# Patient Record
Sex: MALE | Race: BLACK OR AFRICAN AMERICAN | NOT HISPANIC OR LATINO | Employment: STUDENT | URBAN - METROPOLITAN AREA
[De-identification: names, ages, dates, MRNs, and addresses within clinical notes are randomized per-mention and may not be internally consistent; named-entity substitution may affect disease eponyms.]

---
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
## 2020-08-10 ENCOUNTER — TELEPHONE (OUTPATIENT)
Dept: SPORTS MEDICINE | Facility: CLINIC | Age: 20
End: 2020-08-10

## 2020-08-10 DIAGNOSIS — Z20.822 EXPOSURE TO COVID-19 VIRUS: Primary | ICD-10-CM

## 2020-08-31 ENCOUNTER — LAB VISIT (OUTPATIENT)
Dept: SPORTS MEDICINE | Facility: CLINIC | Age: 20
End: 2020-08-31
Payer: OTHER GOVERNMENT

## 2020-08-31 DIAGNOSIS — Z20.822 EXPOSURE TO COVID-19 VIRUS: ICD-10-CM

## 2020-08-31 PROCEDURE — U0003 INFECTIOUS AGENT DETECTION BY NUCLEIC ACID (DNA OR RNA); SEVERE ACUTE RESPIRATORY SYNDROME CORONAVIRUS 2 (SARS-COV-2) (CORONAVIRUS DISEASE [COVID-19]), AMPLIFIED PROBE TECHNIQUE, MAKING USE OF HIGH THROUGHPUT TECHNOLOGIES AS DESCRIBED BY CMS-2020-01-R: HCPCS

## 2020-09-01 LAB — SARS-COV-2 RNA RESP QL NAA+PROBE: NOT DETECTED

## 2020-11-17 ENCOUNTER — TELEPHONE (OUTPATIENT)
Dept: SPORTS MEDICINE | Facility: CLINIC | Age: 20
End: 2020-11-17

## 2020-11-17 DIAGNOSIS — Z20.822 EXPOSURE TO COVID-19 VIRUS: Primary | ICD-10-CM

## 2020-11-17 DIAGNOSIS — Z03.818 ENCOUNTER FOR OBSERVATION FOR SUSPECTED EXPOSURE TO OTHER BIOLOGICAL AGENTS RULED OUT: ICD-10-CM

## 2020-11-23 ENCOUNTER — LAB VISIT (OUTPATIENT)
Dept: SPORTS MEDICINE | Facility: CLINIC | Age: 20
End: 2020-11-23
Payer: OTHER GOVERNMENT

## 2020-11-23 DIAGNOSIS — Z03.818 ENCOUNTER FOR OBSERVATION FOR SUSPECTED EXPOSURE TO OTHER BIOLOGICAL AGENTS RULED OUT: ICD-10-CM

## 2020-11-23 DIAGNOSIS — Z20.822 EXPOSURE TO COVID-19 VIRUS: ICD-10-CM

## 2020-11-23 LAB — SARS-COV-2 RNA RESP QL NAA+PROBE: NOT DETECTED

## 2020-11-23 PROCEDURE — U0003 INFECTIOUS AGENT DETECTION BY NUCLEIC ACID (DNA OR RNA); SEVERE ACUTE RESPIRATORY SYNDROME CORONAVIRUS 2 (SARS-COV-2) (CORONAVIRUS DISEASE [COVID-19]), AMPLIFIED PROBE TECHNIQUE, MAKING USE OF HIGH THROUGHPUT TECHNOLOGIES AS DESCRIBED BY CMS-2020-01-R: HCPCS

## 2021-03-03 ENCOUNTER — TELEPHONE (OUTPATIENT)
Dept: SPORTS MEDICINE | Facility: CLINIC | Age: 21
End: 2021-03-03

## 2021-03-03 DIAGNOSIS — Z03.818 ENCOUNTER FOR OBSERVATION FOR SUSPECTED EXPOSURE TO OTHER BIOLOGICAL AGENTS RULED OUT: ICD-10-CM

## 2021-03-03 DIAGNOSIS — Z20.822 EXPOSURE TO COVID-19 VIRUS: Primary | ICD-10-CM

## 2021-11-26 ENCOUNTER — ATHLETIC TRAINING SESSION (OUTPATIENT)
Dept: SPORTS MEDICINE | Facility: CLINIC | Age: 21
End: 2021-11-26
Payer: OTHER GOVERNMENT

## 2022-01-16 ENCOUNTER — OFFICE VISIT (OUTPATIENT)
Dept: SPORTS MEDICINE | Facility: CLINIC | Age: 22
End: 2022-01-16
Payer: OTHER GOVERNMENT

## 2022-01-16 DIAGNOSIS — Z86.16 HISTORY OF 2019 NOVEL CORONAVIRUS DISEASE (COVID-19): Primary | ICD-10-CM

## 2022-01-16 DIAGNOSIS — Z86.19 PERSONAL HISTORY OF OTHER INFECTIOUS AND PARASITIC DISEASES: ICD-10-CM

## 2022-01-16 DIAGNOSIS — Z03.818 ENCOUNTER FOR OBSERVATION FOR SUSPECTED EXPOSURE TO OTHER BIOLOGICAL AGENTS RULED OUT: ICD-10-CM

## 2022-01-16 PROCEDURE — 99204 PR OFFICE/OUTPT VISIT, NEW, LEVL IV, 45-59 MIN: ICD-10-PCS | Mod: 95,,, | Performed by: ORTHOPAEDIC SURGERY

## 2022-01-16 PROCEDURE — 99204 OFFICE O/P NEW MOD 45 MIN: CPT | Mod: 95,,, | Performed by: ORTHOPAEDIC SURGERY

## 2022-01-18 NOTE — PROGRESS NOTES
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 Got tested the following day and was positive. Denies personal history of cardiac or pulmonary disease. He is fully vaccinated but did not yet get the booster shot. She   Date of positive test: 1/10/2022  Time in isolation: 5 days  Symptoms during viral course: congestion, cough, stuffy nose  Hospitalization required?: no     REVIEW OF SYSTEMS:   Constitution: Patient denies fever or chills.  Eyes: Patient denies eye pain or vision changes.  CVS: Patient denies chest pain.  Lungs: Patient denies shortness of breath or cough.  Abdomen: Patient denies any stomach pain, nausea, vomiting, or diarrhea  Skin: Patient denies skin rash or itching.    Musculoskeletal: Patient denies recent injuries or trauma.  Neuro: Patient denies any numbness or tingling in upper or lower extremities.  Psych: Patient denies any current anxiety or nervousness.     PAST MEDICAL HISTORY:   History reviewed. No pertinent past medical history.    PAST SURGICAL HISTORY:  History reviewed. No pertinent surgical history.     FAMILY HISTORY:  History reviewed. No pertinent family history.    SOCIAL HISTORY:  Social Connections: Not on file       MEDICATIONS:   No current outpatient medications on file.    ALLERGIES:   Review of patient's allergies indicates:  No Known Allergies     PHYSICAL EXAMINATION:  HR - 76  Vitals signs and nursing note have been reviewed.  General: In no acute distress, well developed, well nourished, no diaphoresis  Eyes: EOM full and smooth, no eye redness or discharge  HENT: normocephalic and atraumatic, neck supple, trachea midline, no nasal discharge  Cardiovascular:  Regular rate and rhythm, S1 and S2 auscultated, no S3, no S4, no murmurs, no thrills, no JVD, no LE edema, bilateral and symmetric 2+ DP and radial pulses bilaterally  Lungs: respirations non-labored, no  conversational dyspnea, CTABL, no wheezing, no rhonchi  Neuro: AAOx3, CN2-12 grossly intact  Skin: No rashes, warm and dry  Psychiatric: cooperative, pleasant, mood and affect appropriate for age      ASSESSMENT:  1. History of 2019 novel coronavirus disease (COVID-19)    2. Personal history of other infectious and parasitic diseases    3. Encounter for observation for suspected exposure to other biological agents ruled out          PLAN:  1-3. History of COVID-19 -     -  Patient is connecting via a virtual visit to discuss clearance back to sport. Patient previously tested positive for COVID-19 on 1/10/2022 and is here to obtain cardiac clearance prior to engaging in athletic activity due to possibility of myocarditis.  During the course of his COVID-19 infection, patient experienced mild symptoms. Patient denies known family history of cardiac conditions or early death.      - Virtual visit performed in conjunction with [facility] cardiac and pulmonary exam.  Adequate samples were obtained for both heart and lung fields and audio component was appropriate for proper evaluation.     - At this time the patient is completely asymptomatic and there are no symptoms or exam findings concerning for myocarditis. In my opinion it is safe for the patient to start progressing weight lifting and cardiovascular training slowly and under the supervision of the athletic training staff. Education on myocarditis provided today.     - ATC present during the entire virtual visit and is on board with the plan. Based on his symptoms and schedule, he is going to stay in isolation through the weekend at least (not travel with the team) as he is concerned about spread to his teammates.     - Vaccination status: fully vaccinated. Will get booster after the 10 day isolation period.      Future planning includes -  If symptoms exacerbate during return to activity, referral to cardiology, possibly more cardiac testing and labs     All  questions were answered to the best of my ability and all concerns were addressed at this time.     Follow up as needed for above. I will remain in close contact with the  to insure no symptoms occur when returning to exercise.        This note is dictated using the M*Modal Fluency Direct word recognition program. There are word recognition mistakes that are occasionally missed on review.

## 2022-02-11 ENCOUNTER — ATHLETIC TRAINING SESSION (OUTPATIENT)
Dept: SPORTS MEDICINE | Facility: CLINIC | Age: 22
End: 2022-02-11

## 2022-02-11 NOTE — PROGRESS NOTES
Subjective:          Chief Complaint: Sarwat Mensah is a 21 y.o. male who had concerns including Pain of the Left Ankle.    Pt stated he woke up with Left ankle pain at a 6/10 this morning but it has decreased to a 3/10 and is not having much pain would like to be evaluated to make sure he is okay.  Handedness: right-handed  Sport played: basketball      Level: college                Review of Systems   All other systems reviewed and are negative.                  Objective:        General: Sarwat is well-developed, well-nourished, appears stated age, in no acute distress, alert and oriented to time, place and person.             Right Ankle/Foot Exam   Right ankle exam is normal.    Left Ankle/Foot Exam     Inspection  Deformity: absent  Erythema: absent  Bruising: Ankle - absent Foot - absent  Effusion: Ankle - absent Foot - absent  Atrophy: Ankle - absent Foot - absent  Scars: absent    Range of Motion   Ankle Joint  Dorsiflexion: normal   Plantar flexion: normal     Subtalar Joint   Inversion: normal   Eversion: normal   Girard Test:  normal  First MTP Joint: normal    Muscle Strength   The patient has normal left ankle strength.    Tests   Anterior drawer: negative  Varus tilt: negative  External Rotation Test: negative  Squeeze Test: absent    Other   Foot Crepitus:  absent  Ankle Crepitus: absent  Sensation: normal                Assessment:       Left ankle sprain       Plan:       Electricity in the [facility]/ athletic training facility went out at the time of the assessment. Pt was instructed to return to athletic training facility if pain continued after receiving HEP.    HEP:  ABCs 3x3   Single leg calf raise: 3x10  Towel scrunches 3x20  Heel walks [id number]

## 2022-02-25 ENCOUNTER — ATHLETIC TRAINING SESSION (OUTPATIENT)
Dept: SPORTS MEDICINE | Facility: CLINIC | Age: 22
End: 2022-02-25

## 2022-07-25 ENCOUNTER — ATHLETIC TRAINING SESSION (OUTPATIENT)
Dept: SPORTS MEDICINE | Facility: CLINIC | Age: 22
End: 2022-07-25
Payer: OTHER GOVERNMENT

## 2022-07-25 NOTE — PROGRESS NOTES
[facility]       Athletic Training Daily Treatment/Rehab Note     Name: Sarwat Mensah  Clinic Number: [id number]  Sport:male  basketball     Initial Injury Date: 2/25/2022  Initial Injury Evaluation: 2/25/2022    Team Physicians: Dr. Avinash Nolasco, Dr. Mercedes Nolasco, Dr. 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       Pain: 5/10  Pain Location: Top incisor (tooth number 8)    Objective   No swelling present   Minimal displacment of the front tooth         Assessment     Tooth displacement.      Plan     Pt will be seen by dentist in [facility] to readjust tooth.  Follow up appointment will be scheduled in [place].           Giselle Vincent MSAT, LAT, ATC    [employer] [facility]

## 2022-07-25 NOTE — PROGRESS NOTES
[facility]       Athletic Training Daily Treatment/Rehab Note     Name: Sarwat Mensah  Clinic Number: [id number]  Sport:male  basketball     Initial Injury Date: 2/25/2022  Initial Injury Evaluation: 2/25/2022    Team Physicians: Dr. Avinash Nolasco, Dr. Mercedes Nolasco, Dr. 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     3/2/2022: Follow up appointment for tooth displacement at [facility].   3/9/2022: Pt follow up for crown completed today.      Pain: 1/10  Pain Location: Top incisor (tooth number 8)    Objective   No swelling present   Minimal displacment of the front tooth         Assessment     Tooth displacement.      Plan     Pt's crown was completed at [facility] on 3/9/2022. Pt was instructed to contact AT if he had any pain or concerns. Pt was instructed to wear mouth guard for remainder of the season.         Giselle Vincent MSAT, LAT, ATC    [employer] [place] [facility]